# Patient Record
Sex: FEMALE | Race: WHITE | Employment: UNEMPLOYED | URBAN - NONMETROPOLITAN AREA
[De-identification: names, ages, dates, MRNs, and addresses within clinical notes are randomized per-mention and may not be internally consistent; named-entity substitution may affect disease eponyms.]

---

## 2020-11-17 ENCOUNTER — HOSPITAL ENCOUNTER (EMERGENCY)
Age: 20
Discharge: HOME | End: 2020-11-17
Attending: EMERGENCY MEDICINE
Payer: COMMERCIAL

## 2020-11-17 VITALS
WEIGHT: 165 LBS | HEART RATE: 79 BPM | DIASTOLIC BLOOD PRESSURE: 59 MMHG | SYSTOLIC BLOOD PRESSURE: 117 MMHG | TEMPERATURE: 97.8 F | RESPIRATION RATE: 16 BRPM | OXYGEN SATURATION: 97 %

## 2020-11-17 PROBLEM — F32.A DEPRESSION WITH SUICIDAL IDEATION: Status: ACTIVE | Noted: 2020-11-17

## 2020-11-17 PROBLEM — R45.851 DEPRESSION WITH SUICIDAL IDEATION: Status: ACTIVE | Noted: 2020-11-17

## 2020-11-17 LAB
-: ABNORMAL
ABSOLUTE EOS #: <0.03 K/UL (ref 0–0.44)
ABSOLUTE IMMATURE GRANULOCYTE: 0.03 K/UL (ref 0–0.3)
ABSOLUTE LYMPH #: 1.25 K/UL (ref 1.2–5.2)
ABSOLUTE MONO #: 0.55 K/UL (ref 0.1–1.4)
ACETAMINOPHEN LEVEL: <5 UG/ML (ref 10–30)
ALBUMIN SERPL-MCNC: 4.7 G/DL (ref 3.5–5.2)
ALBUMIN/GLOBULIN RATIO: 1.7 (ref 1–2.5)
ALP BLD-CCNC: 79 U/L (ref 35–104)
ALT SERPL-CCNC: 14 U/L (ref 5–33)
AMORPHOUS: ABNORMAL
AMPHETAMINE SCREEN URINE: NEGATIVE
ANION GAP SERPL CALCULATED.3IONS-SCNC: 12 MMOL/L (ref 9–17)
AST SERPL-CCNC: 26 U/L
BACTERIA: ABNORMAL
BARBITURATE SCREEN URINE: NEGATIVE
BASOPHILS # BLD: 1 % (ref 0–2)
BASOPHILS ABSOLUTE: 0.06 K/UL (ref 0–0.2)
BENZODIAZEPINE SCREEN, URINE: NEGATIVE
BILIRUB SERPL-MCNC: 0.27 MG/DL (ref 0.3–1.2)
BILIRUBIN URINE: NEGATIVE
BUN BLDV-MCNC: 15 MG/DL (ref 6–20)
BUN/CREAT BLD: 20 (ref 9–20)
BUPRENORPHINE URINE: NEGATIVE
CALCIUM SERPL-MCNC: 9.4 MG/DL (ref 8.6–10.4)
CANNABINOID SCREEN URINE: NEGATIVE
CASTS UA: ABNORMAL /LPF
CHLORIDE BLD-SCNC: 104 MMOL/L (ref 98–107)
CO2: 21 MMOL/L (ref 20–31)
COCAINE METABOLITE, URINE: NEGATIVE
COLOR: YELLOW
COMMENT UA: ABNORMAL
CREAT SERPL-MCNC: 0.75 MG/DL (ref 0.5–0.9)
CRYSTALS, UA: ABNORMAL /HPF
DIFFERENTIAL TYPE: ABNORMAL
EKG ATRIAL RATE: 75 BPM
EKG P AXIS: 67 DEGREES
EKG P-R INTERVAL: 152 MS
EKG Q-T INTERVAL: 376 MS
EKG QRS DURATION: 80 MS
EKG QTC CALCULATION (BAZETT): 419 MS
EKG R AXIS: 42 DEGREES
EKG T AXIS: 36 DEGREES
EKG VENTRICULAR RATE: 75 BPM
EOSINOPHILS RELATIVE PERCENT: 0 % (ref 1–4)
EPITHELIAL CELLS UA: ABNORMAL /HPF (ref 0–25)
ETHANOL PERCENT: <0.01 %
ETHANOL: <10 MG/DL
GFR AFRICAN AMERICAN: >60 ML/MIN
GFR NON-AFRICAN AMERICAN: >60 ML/MIN
GFR SERPL CREATININE-BSD FRML MDRD: ABNORMAL ML/MIN/{1.73_M2}
GFR SERPL CREATININE-BSD FRML MDRD: ABNORMAL ML/MIN/{1.73_M2}
GLUCOSE BLD-MCNC: 110 MG/DL (ref 70–99)
GLUCOSE URINE: NEGATIVE
HCG(URINE) PREGNANCY TEST: NEGATIVE
HCT VFR BLD CALC: 39.4 % (ref 36.3–47.1)
HEMOGLOBIN: 12.8 G/DL (ref 11.9–15.1)
IMMATURE GRANULOCYTES: 0 %
KETONES, URINE: ABNORMAL
LEUKOCYTE ESTERASE, URINE: ABNORMAL
LYMPHOCYTES # BLD: 12 % (ref 25–45)
MCH RBC QN AUTO: 27.6 PG (ref 25.2–33.5)
MCHC RBC AUTO-ENTMCNC: 32.5 G/DL (ref 28.4–34.8)
MCV RBC AUTO: 85.1 FL (ref 82.6–102.9)
MDMA URINE: NORMAL
METHADONE SCREEN, URINE: NEGATIVE
METHAMPHETAMINE, URINE: NEGATIVE
MONOCYTES # BLD: 5 % (ref 2–8)
MUCUS: ABNORMAL
NITRITE, URINE: NEGATIVE
NRBC AUTOMATED: 0 PER 100 WBC
OPIATES, URINE: NEGATIVE
OTHER OBSERVATIONS UA: ABNORMAL
OXYCODONE SCREEN URINE: NEGATIVE
PDW BLD-RTO: 13.6 % (ref 11.8–14.4)
PH UA: 6 (ref 5–9)
PHENCYCLIDINE, URINE: NEGATIVE
PLATELET # BLD: 363 K/UL (ref 138–453)
PLATELET ESTIMATE: ABNORMAL
PMV BLD AUTO: 10.7 FL (ref 8.1–13.5)
POTASSIUM SERPL-SCNC: 3.9 MMOL/L (ref 3.7–5.3)
PROPOXYPHENE, URINE: NEGATIVE
PROTEIN UA: NEGATIVE
RBC # BLD: 4.63 M/UL (ref 3.95–5.11)
RBC # BLD: ABNORMAL 10*6/UL
RBC UA: ABNORMAL /HPF (ref 0–2)
RENAL EPITHELIAL, UA: ABNORMAL /HPF
SALICYLATE LEVEL: <1 MG/DL (ref 3–10)
SARS-COV-2, RAPID: NOT DETECTED
SARS-COV-2: NORMAL
SARS-COV-2: NORMAL
SEG NEUTROPHILS: 82 % (ref 34–64)
SEGMENTED NEUTROPHILS ABSOLUTE COUNT: 8.64 K/UL (ref 1.8–8)
SODIUM BLD-SCNC: 137 MMOL/L (ref 135–144)
SOURCE: NORMAL
SPECIFIC GRAVITY UA: >1.03 (ref 1.01–1.02)
TEST INFORMATION: NORMAL
TOTAL PROTEIN: 7.5 G/DL (ref 6.4–8.3)
TRICHOMONAS: ABNORMAL
TRICYCLIC ANTIDEPRESSANTS, UR: NEGATIVE
TSH SERPL DL<=0.05 MIU/L-ACNC: 2.39 MIU/L (ref 0.3–5)
TURBIDITY: CLEAR
URINE HGB: ABNORMAL
UROBILINOGEN, URINE: NORMAL
WBC # BLD: 10.6 K/UL (ref 4.5–13.5)
WBC # BLD: ABNORMAL 10*3/UL
WBC UA: ABNORMAL /HPF (ref 0–5)
YEAST: ABNORMAL

## 2020-11-17 PROCEDURE — 84443 ASSAY THYROID STIM HORMONE: CPT

## 2020-11-17 PROCEDURE — 85025 COMPLETE CBC W/AUTO DIFF WBC: CPT

## 2020-11-17 PROCEDURE — 93005 ELECTROCARDIOGRAM TRACING: CPT | Performed by: EMERGENCY MEDICINE

## 2020-11-17 PROCEDURE — 36415 COLL VENOUS BLD VENIPUNCTURE: CPT

## 2020-11-17 PROCEDURE — 93010 ELECTROCARDIOGRAM REPORT: CPT | Performed by: FAMILY MEDICINE

## 2020-11-17 PROCEDURE — G0480 DRUG TEST DEF 1-7 CLASSES: HCPCS

## 2020-11-17 PROCEDURE — 81025 URINE PREGNANCY TEST: CPT

## 2020-11-17 PROCEDURE — 81001 URINALYSIS AUTO W/SCOPE: CPT

## 2020-11-17 PROCEDURE — 80307 DRUG TEST PRSMV CHEM ANLYZR: CPT

## 2020-11-17 PROCEDURE — 80306 DRUG TEST PRSMV INSTRMNT: CPT

## 2020-11-17 PROCEDURE — 80053 COMPREHEN METABOLIC PANEL: CPT

## 2020-11-17 PROCEDURE — 99284 EMERGENCY DEPT VISIT MOD MDM: CPT

## 2020-11-17 PROCEDURE — U0002 COVID-19 LAB TEST NON-CDC: HCPCS

## 2020-11-17 ASSESSMENT — ENCOUNTER SYMPTOMS
DIARRHEA: 0
SORE THROAT: 0
COUGH: 0
EYE PAIN: 0
CHOKING: 0
TROUBLE SWALLOWING: 0
BACK PAIN: 0
SHORTNESS OF BREATH: 0
ABDOMINAL PAIN: 0
EYE REDNESS: 0
VOMITING: 0
NAUSEA: 0

## 2020-11-17 ASSESSMENT — PATIENT HEALTH QUESTIONNAIRE - PHQ9: SUM OF ALL RESPONSES TO PHQ QUESTIONS 1-9: 16

## 2020-11-17 NOTE — ED NOTES
Called [facility] for transfer to [facility].  Connected to [place] from [place] who the spoke to Dr Edel Fitzpatrick  11/17/20 8707

## 2020-11-17 NOTE — ED NOTES
Mother at bedside with patient. Safety plan reviewed with both patient and mother. Both deny questions at this time.       Jameson Charlton RN  11/17/20 8419
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
## 2020-11-17 NOTE — SUICIDE SAFETY PLAN
SAFETY PLAN    A suicide Safety Plan is a document that supports someone when they are having thoughts of suicide. Warning Signs that indicate a suicidal crisis may be developing: What (situations, thoughts, feelings, body sensations, behaviors, etc.) do you experience that lets you know you are beginning to think about suicide? 1. I get droopy. 2. I have less energy an motivation. 3. I shut down. Internal Coping Strategies:  What things can I do (relaxation techniques, hobbies, physical activities, etc.) to take my mind off my problems without contacting another person? 1. I can take deep breaths. 2. I can do for a run. 3. I can go work out. People and social settings that provide distraction: Who can I call or where can I go to distract me? 1. Name: Mom  Phone: [phone]  2. Name: Adrianna Pettit Phone:  [phone]  3. Place: Any store            4. Place: The gym    People whom I can ask for help: Who can I call when I need help - for example, friends, family, clergy, someone else? 1. Name: Mom                Phone: [phone]  2. Name: Adrianna Pettit Phone: [phone]    Professionals or [address] [place] [address] I can contact during a crisis: Who can I call for help - for example, my doctor, my psychiatrist, my psychologist, a mental health provider, a suicide hotline? 1. Clinician Name: Rob Mckeon   Phone: [phone]          3. Suicide Prevention Lifeline: 7-369-104-TALK (9816)    4. [address] [place] [address] Emergency Services -  for example, [address] [place] [address] suicide hotline, [facility]      Emergency Services Address: [address] [place] [address] [address]       Emergency Services Phone: [phone]    Making the environment safe: How can I make my environment (house/apartment/living space) safer? For example, can I remove guns, medications, and other items? 1. Remove medications.

## 2020-11-17 NOTE — ED NOTES
Writer at bedside with Dr. Laura Rosas to notify patient that she will be transferred to [facility] for inpatient psych. Patient upset and stating that she really does not want to go. Dr. Laura Rosas spoke with patient regarding the benefits of transfer.       Hailey Mcqueen RN  11/17/20 9325

## 2020-11-17 NOTE — ED NOTES
Pt assigned room at [facility], ETA 5637     Sona Cassidy  11/17/20 1286

## 2020-11-17 NOTE — ED NOTES
Writer at bedside with Dr. Ena Davey and patients father. Patient and father are both requesting that patient be released into the care of her parents. Patient states she is no longer suicidal. Parents state that they will take patient home and call PCP in am to have patient evaluated by a counselor. Dr. Ena Davey in agreement that patient can go home with parents on a safety plan.       Jean Kebede RN  11/17/20 9706

## 2020-11-17 NOTE — ED NOTES
Report given to Russell Patel at [facility]. Once COVID swab is back, she will call with a room.       Chente Woodard RN  11/17/20 9348

## 2020-11-18 ENCOUNTER — CARE COORDINATION (OUTPATIENT)
Dept: CARE COORDINATION | Age: 20
End: 2020-11-18

## 2020-11-18 NOTE — CARE COORDINATION
Reason For Call Today:  -Attempted to reach Nava Corbinse (parent)  today for patients ED Follow Up/ COVID at risk monitoring at [facility] on 11/17/2020   -Unable to reach Nava Glimpse today   -Left a voicemail message with reason for call. Reviewed ED AVS instructions. Left phone number for her to contact this ACM with any further questions or concerns, [phone].       -Attempted to reach Aida Lines  today for her ED Follow Up/ COVID at risk monitoring at [facility] on 11/17/2020  -Unable to reach Aida Lines today   -COVID swab was negative   -Left a voicemail message with reason for call. Revieewed ED AVS instructions. Left phone number for her to contact this AC with any further questions or concerns, [phone].

## 2021-04-20 ENCOUNTER — TELEMEDICINE (OUTPATIENT)
Dept: ORTHOPEDIC SURGERY | Age: 21
End: 2021-04-20
Payer: COMMERCIAL

## 2021-04-20 DIAGNOSIS — S06.0X0A CONCUSSION WITHOUT LOSS OF CONSCIOUSNESS, INITIAL ENCOUNTER: Primary | ICD-10-CM

## 2021-04-20 PROCEDURE — 99204 OFFICE O/P NEW MOD 45 MIN: CPT | Performed by: FAMILY MEDICINE

## 2021-04-20 NOTE — PROGRESS NOTES
Sports Medicine    School:  [employer]  Grade:   [employer]  Sports:  volleyball    History:   2021    TELEHEALTH EVALUATION -- Audio/Visual (During YCKUA-80 public health emergency)    HPI:    Dorita Lesches (:  2000) has requested an audio/video evaluation for the following concern(s):     Dorita Lesches is a 21 y.o. female who presents for evaluation of a possible concussion. Initial evaluation was performed by the . Injury occurred 8 days ago while playing volleyball. Mechanism of injury was head to shoulder and then head to ground contact.       Current 3 worst symptoms rare headaches with extended screen time    Surface: Hardwood/Court  Mouthpiece?: No  Chinstrap Buckled?: n/a  Did helmet come off?: No  Loss of consciousness?: No  Retrograde amnesia?: No  Antegrade amnesia?: Yes  Evaluated by another provider?: yes - jo atc  Quality of sleep the night of concussion?: trouble staying asleep due to HA  School, full or half days?: Full  Concentration in school: at first yes but better no  Fatigue, which period?: no  Napping: no  Sleeping: no further issues  Medication usage: none  Behavior: normal    SCAT 5 Symptoms (score 0-6)  Headache:     1  \"Pressure in head\":  0  Neck Pain:     0  Nausea or vomitin  Dizziness:     0  Blurred vision:    0  Balance problems:    0  Sensitivity to light:    1  Sensitivity to noise:    0  Feeling slowed down:  0  Feeling like \"in a fog\":  0  \"Don't feel right\":   0  Difficulty concentratin  Difficulty rememberin  Fatigue or low energy: 0  Confusion:     0  Drowsiness:   0  More emotional:  0  Irritability:   0  Sadness:   0  Nervous or anxious:  0  Trouble falling asleep:  0    Total number of symptoms (maximum possible 22): 2  Symptom severity score (add all scores in table, maximum possible 132): 2    14 system review of system was reviewed and otherwise negative except stated in HPI and SCAT5 Symptoms     Do the symptoms get worse with physical activity? no  Do the symptoms get worse with mental activity? yes    Overall rating  How different is patient acting compared to his/her usual self? normal    Concussion History:  Have you ever had a concussion or had any symptoms that may have  occurred as a result of a head injury? no  When? What symptoms? Did you experience amnesia? N/A  Retrograde? N/A  Antegrade? N/A  Did you lose consciousness? N/A  How much time did you miss before you returned to full competition? Medical History:  Headaches no  Migraines no  Learning disability/dyslexia no  ADD/ADHD no  Depression, anxiety, other psychiatric disorder no  Seizure disorder no    Family History:  Migraines no  Learning disability/dyslexia no  ADD/ADHD no  Depression, anxiety, other psychiatric disorder no  Seizure disorder no    Review of Systems    Prior to Visit Medications    Not on File       Social History     Tobacco Use    Smoking status: Never Smoker    Smokeless tobacco: Never Used   Substance Use Topics    Alcohol use: Not on file    Drug use: Not on file        Allergies   Allergen Reactions    Amoxicillin Hives   ,   Past Medical History:   Diagnosis Date    Depression    ,   Past Surgical History:   Procedure Laterality Date    ANTERIOR CRUCIATE LIGAMENT REPAIR Left    ,   Social History     Tobacco Use    Smoking status: Never Smoker    Smokeless tobacco: Never Used   Substance Use Topics    Alcohol use: Not on file    Drug use: Not on file   , History reviewed. No pertinent family history. ,   There is no immunization history on file for this patient.,   Health Maintenance   Topic Date Due    Hepatitis C screen  Never done    Varicella vaccine (1 of 2 - 2-dose childhood series) Never done    HPV vaccine (1 - 2-dose series) Never done    HIV screen  Never done    COVID-19 Vaccine (1) Never done    Chlamydia screen  Never done    DTaP/Tdap/Td vaccine (1 - Tdap) Never done    Flu vaccine (Season Ended) 09/01/2021    Hepatitis A vaccine  Aged Out    Hepatitis B vaccine  Aged Out    Hib vaccine  Aged Out    Meningococcal (ACWY) vaccine  Aged Out    Pneumococcal 0-64 years Vaccine  Aged Out        Objective: There were no vitals filed for this visit. Constitutional: [x] Appears well-developed and well-nourished [x] No apparent distress      [] Abnormal-   Mental status  [x] Alert and awake  [x] Oriented to person/place/time [x]Able to follow commands      Eyes:  EOM    [x]  Normal  [] Abnormal-  Sclera  [x]  Normal  [] Abnormal -         Discharge [x]  None visible  [] Abnormal -    HENT:   [x] Normocephalic, atraumatic. [x] Abnormal   [x] Mouth/Throat: Mucous membranes are moist.     External Ears [x] Normal  [] Abnormal-     Pulmonary/Chest: [x] Respiratory effort normal.  [x] No visualized signs of difficulty breathing or respiratory distress        [] Abnormal-     Neck:  Tenderness to palpation none   Full ROM in flexion, extension, lateral rotation, and lateral flexion. Neurological:    Alert and oriented x 3. Answers questions appropriately   Cranial Nerves II-XII are grossly intact. PEARRLA. Extraocular movements are intact   Nystagmus none   Photophobia no   Pain with upward lateral or lateral gaze no   5/5 strength in all myotomes of bilateral upper extremities. 5/5 strength in all myotomes of bilateral lower extremities.    Sensation intact in all extremities   Deep tendon reflexes are WNL   Nod testing normal   Side to side head movement normal   [place] Convergence normal   Finger to nose testing:  normal   Heel to toe testing normal   Romberg Balance:   Eyes open normal            Eyes closed normal   Tandem balance:     Eyes open  normal   Eyes closed normal        Skin:        [x] No significant exanthematous lesions or discoloration noted on facial skin         [] Abnormal-            Psychiatric:       [x] Normal Affect [x] No Hallucinations        [] Abnormal-       Assessment:    Eduardo Bernard Monique Schumacher is a 21 y.o. female with     1. Concussion without loss of consciousness, initial encounter             Plan:     Discussed risks of returning to activities prior to being cleared, including Second Impact Syndrome, Discussed risks of increased susceptibility to future concussions, Discussed post-concussion syndrome, Full days of  school with no restrictions and Cleared to start return to play, step-by-step instructions reviewed     Discussed the assessment and plan in detail. All questions were answered. No follow-ups on file. Yolanda Galloway DO    Iam Ignacio is a 21 y.o. female being evaluated by a Virtual Visit (video visit) encounter to address concerns as mentioned above. A caregiver was present when appropriate. Due to this being a TeleHealth encounter ([facility]), evaluation of the following organ systems was limited: Vitals/Constitutional/EENT/Resp/CV/GI//MS/Neuro/Skin/Heme-Lymph-Imm. Pursuant to the emergency declaration under the [address] [place] [address] and the [employer] and [employer] Act, this Virtual Visit was conducted with patient's (and/or legal guardian's) consent, to reduce the patient's risk of exposure to COVID-19 and provide necessary medical care. The patient (and/or legal guardian) has also been advised to contact this office for worsening conditions or problems, and seek emergency medical treatment and/or call 911 if deemed necessary. Patient identification was verified at the start of the visit: Yes    Total time spent on this encounter: 39'    Time spent on face to face counseling/coordination of care >25' discussing plans as above    Services were provided through a video synchronous discussion virtually to substitute for in-person clinic visit. Patient and provider were located at their individual homes.     --Yolanda Galloway DO on 4/20/2021 at 12:42 PM    An electronic signature was used to authenticate this note.

## 2022-05-29 ENCOUNTER — APPOINTMENT (OUTPATIENT)
Dept: GENERAL RADIOLOGY | Age: 22
End: 2022-05-29
Payer: COMMERCIAL

## 2022-05-29 ENCOUNTER — HOSPITAL ENCOUNTER (EMERGENCY)
Age: 22
Discharge: HOME | End: 2022-05-29
Payer: COMMERCIAL

## 2022-05-29 VITALS
RESPIRATION RATE: 17 BRPM | SYSTOLIC BLOOD PRESSURE: 119 MMHG | TEMPERATURE: 97.3 F | DIASTOLIC BLOOD PRESSURE: 66 MMHG | OXYGEN SATURATION: 99 % | HEART RATE: 74 BPM

## 2022-05-29 DIAGNOSIS — K21.00 GASTROESOPHAGEAL REFLUX DISEASE WITH ESOPHAGITIS WITHOUT HEMORRHAGE: Primary | ICD-10-CM

## 2022-05-29 LAB
HCG(URINE) PREGNANCY TEST: NEGATIVE
SARS-COV-2, RAPID: NOT DETECTED
SPECIMEN DESCRIPTION: NORMAL

## 2022-05-29 PROCEDURE — 99285 EMERGENCY DEPT VISIT HI MDM: CPT

## 2022-05-29 PROCEDURE — 71046 X-RAY EXAM CHEST 2 VIEWS: CPT

## 2022-05-29 PROCEDURE — 93005 ELECTROCARDIOGRAM TRACING: CPT | Performed by: NURSE PRACTITIONER

## 2022-05-29 PROCEDURE — 87635 SARS-COV-2 COVID-19 AMP PRB: CPT

## 2022-05-29 PROCEDURE — 81025 URINE PREGNANCY TEST: CPT

## 2022-05-29 PROCEDURE — C9803 HOPD COVID-19 SPEC COLLECT: HCPCS

## 2022-05-29 PROCEDURE — 6370000000 HC RX 637 (ALT 250 FOR IP): Performed by: NURSE PRACTITIONER

## 2022-05-29 RX ORDER — FAMOTIDINE 20 MG/1
20 TABLET, FILM COATED ORAL 2 TIMES DAILY
Qty: 60 TABLET | Refills: 3 | Status: SHIPPED | OUTPATIENT
Start: 2022-05-29

## 2022-05-29 RX ADMIN — LIDOCAINE HYDROCHLORIDE: 20 SOLUTION OROPHARYNGEAL at 21:50

## 2022-05-30 NOTE — ED PROVIDER NOTES
[facility] ED  EMERGENCY DEPARTMENT ENCOUNTER      Pt Name: Eri Sims  MRN: [id number]  [place] 2000  Date of evaluation: 5/29/2022  Provider: ELVIN Orosco CNP    CHIEF COMPLAINT       Chief Complaint   Patient presents with    Chest Pain     Pt states pain and tightness began about 1p, report hx of anxiety    Anxiety         HISTORY OF PRESENT ILLNESS   (Location/Symptom, Timing/Onset, Context/Setting, Quality, Duration, Modifying Factors, Severity)  Note limiting factors. Eri Sims is a 24 y.o. female who presents to the emergency department with complaints of epigastric and substernal pain onset while she was lying down at noon today. Patient states she may be pregnant. She denies fever. Reports about aches or chills. No headache or neck pain. Denies sore throat Diffley swallowing. No cough or difficulty breathing. Reports no abdominal pain, vomiting, diarrhea or dysuria. No known sick contacts. HPI    Nursing Notes were reviewed. REVIEW OF SYSTEMS    (2-9 systems for level 4, 10 or more for level 5)     Review of Systems    Except as noted above the remainder of the review of systems was reviewed and negative. PAST MEDICAL HISTORY     Past Medical History:   Diagnosis Date    Depression          SURGICAL HISTORY       Past Surgical History:   Procedure Laterality Date    ANTERIOR CRUCIATE LIGAMENT REPAIR Left          CURRENT MEDICATIONS       Previous Medications    No medications on file       ALLERGIES     Amoxicillin    FAMILY HISTORY     No family history on file.        SOCIAL HISTORY       Social History     Socioeconomic History    Marital status: Single     Spouse name: Not on file    Number of children: Not on file    Years of education: Not on file    Highest education level: Not on file   Occupational History    Not on file   Tobacco Use    Smoking status: Never Smoker    Smokeless tobacco: Never Used   Vaping Use    Vaping Use: Never used   Substance and Sexual Activity    Alcohol use: Not on file    Drug use: Not on file    Sexual activity: Not on file   Other Topics Concern    Not on file   Social History Narrative    Not on file     Social Determinants of Health     Financial Resource Strain:     Difficulty of Paying Living Expenses: Not on file   Food Insecurity:     Worried About Running Out of Food in the Last Year: Not on file    [place] of Food in the Last Year: Not on file   Transportation Needs:     Lack of Transportation (Medical): Not on file    Lack of Transportation (Non-Medical): Not on file   Physical Activity:     Days of Exercise per Week: Not on file    Minutes of Exercise per Session: Not on file   Stress:     Feeling of Stress : Not on file   Social Connections:     Frequency of Communication with Friends and Family: Not on file    Frequency of Social Gatherings with Friends and Family: Not on file    Attends Gnosticist Services: Not on file    Active Member of [address] [place] [address] or Organizations: Not on file    Attends Club or Organization Meetings: Not on file    Marital Status: Not on file   Intimate Partner Violence:     Fear of Current or Ex-Partner: Not on file    Emotionally Abused: Not on file    Physically Abused: Not on file    Sexually Abused: Not on file   Housing Stability:     Unable to Pay for Housing in the Last Year: Not on file    Number of [place] in the Last Year: Not on file    Unstable Housing in the Last Year: Not on file       SCREENINGS         Aaliyah Coma Scale  Eye Opening: Spontaneous  Best Verbal Response: Oriented  Best Motor Response: Obeys commands  Aaliyah Coma Scale Score: 15                                       PHYSICAL EXAM    (up to 7 for level 4, 8 or more for level 5)     ED Triage Vitals   BP Temp Temp src Pulse Resp SpO2 Height Weight   -- -- -- -- -- -- -- --       Physical Exam  General: Well-developed, well-nourished, in no apparent distress.   HEENT exam: Normocephalic, atraumatic. Pupils equal round and reactive to light and external ocular muscles intact. Posterior pharynx noninjected. Oral airway widely patent. No exudate present. Neck exam: Supple no lymphadenopathy, trachea midline. Chest exam: No audible wheezing. No increased respiratory effort. Mild reproducible chest wall tenderness to palpation. Heart: Normal heart rate and rhythm. No murmur. Abdomen: Soft, nontender, nondistended. Back: No midline tenderness. No CVA tenderness. Extremities: Patient moving all extremities or difficulty. Intact distal pulses and sensation. Neurologic: Alert and oriented x3. Able to make informed decisions. Skin exam: Clean dry and intact. DIAGNOSTIC RESULTS     EKG: All EKG's are interpreted by the Emergency Department Physician who either signs or Co-signs this chart in the absence of a cardiologist.    EKG obtained, viewed interpreted by myself revealing normal sinus rhythm with a ventricular rate of 89 bpm, ND interval 1 and 36 ms, QRS duration 78 ms and corrected QTC 4 and 40 ms. No ST segment elevation or depression identified. No STEMI. RADIOLOGY:   Non-plain film images such as CT, Ultrasound and MRI are read by the radiologist. Plain radiographic images are visualized and preliminarily interpreted by the emergency physician with the below findings:        Interpretation per the Radiologist below, if available at the time of this note:    XR CHEST (2 VW)   Final Result   No acute process. ED BEDSIDE ULTRASOUND:   Performed by ED Physician - none    LABS:  Labs Reviewed   COVID-19, RAPID   PREGNANCY, URINE       All other labs were within normal range or not returned as of this dictation. EMERGENCY DEPARTMENT COURSE and DIFFERENTIAL DIAGNOSIS/MDM:   Vitals: There were no vitals filed for this visit.         MDM    Kemar Li is a 24 y.o. female who presents to the emergency department with complaints of epigastric and substernal pain onset while she was lying down at noon today. Patient states she may be pregnant. She denies fever. Reports about aches or chills. No headache or neck pain. Denies sore throat Diffley swallowing. No cough or difficulty breathing. Reports no abdominal pain, vomiting, diarrhea or dysuria. No known sick contacts. Exam remarkable for alert and interactive 27-year-old female no acute distress. HEENT unremarkable. She has no audible wheezing and no increased Rester effort. Her heart has normal rate and rhythm. She has mild reproducible chest wall tenderness to palpation. Abdomen is completely benign. She is fully ambulatory moving all extremities without difficulty. Chest x-ray obtained, viewed myself and read by radiology without acute process identified. EKG unremarkable. Patient given GI cocktail. COVID-19 screening negative. Urine pregnancy test negative. Reassessment shows complete resolution of her symptoms. She is discharged home with Pepcid to use as directed. I have asked her to increase her fluid intake. Avoid spicy and fatty foods for the next 2 weeks. Follow-up with PCP for reevaluation in the next few days. Return for increased pain, fever, difficulty breathing, vomiting, recurrent symptoms or any new or worsening signs or symptoms. REASSESSMENT              CONSULTS:  None    PROCEDURES:  Unless otherwise noted below, none     Procedures        FINAL IMPRESSION      1. Gastroesophageal reflux disease with esophagitis without hemorrhage New Problem         DISPOSITION/PLAN   DISPOSITION Decision To Discharge 05/29/2022 10:05:35 PM      PATIENT REFERRED TO:  pcp    In 2 days  for re-evaluation      DISCHARGE MEDICATIONS:  New Prescriptions    FAMOTIDINE (PEPCID) 20 MG TABLET    Take 1 tablet by mouth 2 times daily     Controlled Substances Monitoring:     No flowsheet data found.     (Please note that portions of this note were completed with a voice recognition program.  Efforts were made to edit the dictations but occasionally words are mis-transcribed.)    ELVIN Cardona CNP (electronically signed)  Attending Emergency Physician           ELVIN Cardona CNP  05/29/22 [id number]

## 2022-05-31 LAB
EKG ATRIAL RATE: 89 BPM
EKG P AXIS: 77 DEGREES
EKG P-R INTERVAL: 136 MS
EKG Q-T INTERVAL: 362 MS
EKG QRS DURATION: 78 MS
EKG QTC CALCULATION (BAZETT): 440 MS
EKG R AXIS: 73 DEGREES
EKG T AXIS: 60 DEGREES
EKG VENTRICULAR RATE: 89 BPM

## 2022-05-31 PROCEDURE — 93010 ELECTROCARDIOGRAM REPORT: CPT | Performed by: INTERNAL MEDICINE
